# Patient Record
Sex: FEMALE | Race: WHITE | Employment: FULL TIME | URBAN - NONMETROPOLITAN AREA
[De-identification: names, ages, dates, MRNs, and addresses within clinical notes are randomized per-mention and may not be internally consistent; named-entity substitution may affect disease eponyms.]

---

## 2017-06-06 ENCOUNTER — HOSPITAL ENCOUNTER (OUTPATIENT)
Dept: GENERAL RADIOLOGY | Age: 57
Discharge: HOME | End: 2017-06-06
Attending: ORTHOPAEDIC SURGERY | Admitting: ORTHOPAEDIC SURGERY

## 2017-06-06 DIAGNOSIS — S42.255D CLOSED NONDISPLACED FRACTURE OF GREATER TUBEROSITY OF LEFT HUMERUS WITH ROUTINE HEALING: ICD-10-CM

## 2017-06-13 ENCOUNTER — HOSPITAL ENCOUNTER (OUTPATIENT)
Dept: PHYSICAL THERAPY | Age: 57
Discharge: HOME | End: 2017-06-30
Attending: ORTHOPAEDIC SURGERY | Admitting: ORTHOPAEDIC SURGERY

## 2017-06-13 ASSESSMENT — PAIN SCALES - GENERAL: PAINLEVEL_OUTOF10: 3

## 2017-06-23 ENCOUNTER — HOSPITAL ENCOUNTER (OUTPATIENT)
Dept: GENERAL RADIOLOGY | Age: 57
Discharge: HOME | End: 2017-06-23
Attending: ORTHOPAEDIC SURGERY | Admitting: ORTHOPAEDIC SURGERY

## 2017-06-23 DIAGNOSIS — S42.255D CLOSED NONDISPLACED FRACTURE OF GREATER TUBEROSITY OF LEFT HUMERUS WITH ROUTINE HEALING: ICD-10-CM

## 2017-07-01 ENCOUNTER — HOSPITAL ENCOUNTER (OUTPATIENT)
Dept: PHYSICAL THERAPY | Age: 57
Discharge: HOME | End: 2017-07-31
Attending: ORTHOPAEDIC SURGERY | Admitting: ORTHOPAEDIC SURGERY

## 2017-08-01 ENCOUNTER — HOSPITAL ENCOUNTER (OUTPATIENT)
Dept: PHYSICAL THERAPY | Age: 57
Discharge: HOME | End: 2017-08-18
Attending: ORTHOPAEDIC SURGERY | Admitting: ORTHOPAEDIC SURGERY

## 2017-08-08 ENCOUNTER — HOSPITAL ENCOUNTER (OUTPATIENT)
Dept: GENERAL RADIOLOGY | Age: 57
Discharge: HOME | End: 2017-08-08
Attending: ORTHOPAEDIC SURGERY | Admitting: ORTHOPAEDIC SURGERY

## 2017-08-08 DIAGNOSIS — S42.255A: ICD-10-CM

## 2021-01-13 ENCOUNTER — APPOINTMENT (OUTPATIENT)
Dept: GENERAL RADIOLOGY | Age: 61
End: 2021-01-13
Payer: COMMERCIAL

## 2021-01-13 ENCOUNTER — HOSPITAL ENCOUNTER (EMERGENCY)
Age: 61
Discharge: HOME | End: 2021-01-13
Attending: EMERGENCY MEDICINE
Payer: COMMERCIAL

## 2021-01-13 VITALS
DIASTOLIC BLOOD PRESSURE: 92 MMHG | HEIGHT: 59 IN | RESPIRATION RATE: 18 BRPM | TEMPERATURE: 97.7 F | BODY MASS INDEX: 59.07 KG/M2 | WEIGHT: 293 LBS | SYSTOLIC BLOOD PRESSURE: 145 MMHG | OXYGEN SATURATION: 99 % | HEART RATE: 90 BPM

## 2021-01-13 DIAGNOSIS — S52.521A CLOSED TORUS FRACTURE OF DISTAL END OF RIGHT RADIUS, INITIAL ENCOUNTER: ICD-10-CM
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
## 2021-01-13 PROCEDURE — 73110 X-RAY EXAM OF WRIST: CPT

## 2021-01-13 PROCEDURE — 73130 X-RAY EXAM OF HAND: CPT

## 2021-01-13 PROCEDURE — 29125 APPL SHORT ARM SPLINT STATIC: CPT

## 2021-01-13 PROCEDURE — 99282 EMERGENCY DEPT VISIT SF MDM: CPT

## 2021-01-13 RX ORDER — BACLOFEN 10 MG/1
10 TABLET ORAL 3 TIMES DAILY PRN
Qty: 12 TABLET | Refills: 0 | Status: SHIPPED | OUTPATIENT
Start: 2021-01-13 | End: 2021-01-17

## 2021-01-13 RX ORDER — ACETAMINOPHEN 500 MG
500 TABLET ORAL EVERY 6 HOURS PRN
Qty: 28 TABLET | Refills: 0 | Status: SHIPPED | OUTPATIENT
Start: 2021-01-13 | End: 2021-02-19

## 2021-01-13 RX ORDER — IBUPROFEN 600 MG/1
600 TABLET ORAL EVERY 6 HOURS PRN
COMMUNITY

## 2021-01-13 ASSESSMENT — PAIN DESCRIPTION - ORIENTATION: ORIENTATION: RIGHT

## 2021-01-13 ASSESSMENT — ENCOUNTER SYMPTOMS
RESPIRATORY NEGATIVE: 1
SINUS PRESSURE: 0
SHORTNESS OF BREATH: 0
COUGH: 0
SINUS PAIN: 0
EYES NEGATIVE: 1
SORE THROAT: 0
CHEST TIGHTNESS: 0
WHEEZING: 0
BACK PAIN: 0
NAUSEA: 0
EYE PAIN: 0
RHINORRHEA: 0
VOMITING: 0
ABDOMINAL PAIN: 0
GASTROINTESTINAL NEGATIVE: 1
FACIAL SWELLING: 0

## 2021-01-13 ASSESSMENT — PAIN DESCRIPTION - DESCRIPTORS: DESCRIPTORS: STABBING

## 2021-01-13 ASSESSMENT — PAIN DESCRIPTION - PAIN TYPE: TYPE: ACUTE PAIN

## 2021-01-13 ASSESSMENT — PAIN SCALES - GENERAL: PAINLEVEL_OUTOF10: 8

## 2021-01-13 NOTE — ED NOTES
Volar splint placed per order; Discharge instructions reviewed; patient verbalized understanding; patient escorted to [facility] for urine drug screen and BAT per employer policy      Carole Hernandez, [address] [facility]  01/13/21 1529

## 2021-01-13 NOTE — ED PROVIDER NOTES
Mikey [address]      Pt Name: Silvia Agarwal  MRN: [id number]  [place] 1960  Date of evaluation: 1/13/2021  Provider: Jb Short DO    CHIEF COMPLAINT       Chief Complaint   Patient presents with   [facility], works at [employer]. Walked up the ramp into work last night about 2030, tripped and fell face first into concrete ramp hitting Rt side of forehead (+ bruising) and abrasions to nose and upper lip. + pain to lateral Rt wrist and 5th posterior metacarpals. + swelling and bruising. Good ROM to 5th digit and wrist.,         HISTORY OF PRESENT ILLNESS      Silvia Agarwal is a 61 y.o. female who presents to the emergency department  for   Chief Complaint   Patient presents with   [facility], works at [employer]. Walked up the ramp into work last night about 2030, tripped and fell face first into concrete ramp hitting Rt side of forehead (+ bruising) and abrasions to nose and upper lip. + pain to lateral Rt wrist and 5th posterior metacarpals. + swelling and bruising. Good ROM to 5th digit and wrist.,       61-year-old female presents emergency department with chief complaint of right wrist and hand pain status post fall while at work. Patient fell onto outstretched arm while walking up a ramp. Patient also reports minor abrasion to the right side of the forehead. Patient denies loss of consciousness. Patient does not take any anticoagulation. Patient denies vomiting or other neuro symptoms. Patient reports pain with movement of the wrist.  Patient denies all other associated symptoms. The history is provided by the patient and medical records. No  was used. Nursing Notes, Triage Notes & Vital Signs were reviewed.       REVIEW OF SYSTEMS    (2-9 systems for level 4, 10 or more for level 5)     Review of Systems Constitutional: Negative. Negative for chills, fatigue and fever. HENT: Negative. Negative for congestion, dental problem, facial swelling, nosebleeds, postnasal drip, rhinorrhea, sinus pressure, sinus pain and sore throat. Eyes: Negative. Negative for pain and visual disturbance. Respiratory: Negative. Negative for cough, chest tightness, shortness of breath and wheezing. Cardiovascular: Negative. Negative for chest pain and palpitations. Gastrointestinal: Negative. Negative for abdominal pain, nausea and vomiting. Genitourinary: Negative. Negative for dysuria, flank pain, frequency, hematuria and urgency. Musculoskeletal: Positive for arthralgias and joint swelling. Negative for back pain, gait problem, myalgias, neck pain and neck stiffness. Skin: Negative. Negative for rash. Neurological: Negative. Negative for dizziness, speech difficulty, light-headedness, numbness and headaches. Psychiatric/Behavioral: Negative. Negative for agitation and confusion. The patient is not nervous/anxious. All other systems reviewed and are negative. Except as noted above the remainder of the review of systems was reviewed and negative. PAST MEDICAL HISTORY     Past Medical History:   Diagnosis Date    Hyperlipidemia     Obesity        Prior to Admission medications    Medication Sig Start Date End Date Taking?  Authorizing Provider   ibuprofen (ADVIL;MOTRIN) 600 MG tablet Take 600 mg by mouth every 6 hours as needed for Pain   Yes Historical Provider, MD   acetaminophen (TYLENOL) 500 MG tablet Take 1 tablet by mouth every 6 hours as needed for Pain 1/13/21 1/20/21 Yes Feng King DO   baclofen (LIORESAL) 10 MG tablet Take 1 tablet by mouth 3 times daily as needed (muscle spasm) 1/13/21 1/17/21 Yes Feng King, DO   omeprazole (PRILOSEC) 10 MG delayed release capsule Take 10 mg by mouth daily   Yes Historical Provider, MD        Patient Active Problem List   Diagnosis  HLD (hyperlipidemia)    Morbid obesity due to excess calories ([address]         SURGICAL HISTORY       Past Surgical History:   Procedure Laterality Date     SECTION      twice    TONSILLECTOMY           CURRENT MEDICATIONS       Previous Medications    IBUPROFEN (ADVIL;MOTRIN) 600 MG TABLET    Take 600 mg by mouth every 6 hours as needed for Pain    OMEPRAZOLE (PRILOSEC) 10 MG DELAYED RELEASE CAPSULE    Take 10 mg by mouth daily       ALLERGIES     Sulfa antibiotics    FAMILY HISTORY     History reviewed. No pertinent family history.        SOCIAL HISTORY       Social History     Socioeconomic History    Marital status:      Spouse name: None    Number of children: 2    Years of education: 15    Highest education level: None   Occupational History    Occupation: [employer]     Comment: [employer]   Social Needs    Financial resource strain: None    Food insecurity     Worry: None     Inability: None    Transportation needs     Medical: None     Non-medical: None   Tobacco Use    Smoking status: Never Smoker    Smokeless tobacco: Never Used   Substance and Sexual Activity    Alcohol use: No    Drug use: No    Sexual activity: Yes     Partners: Male   Lifestyle    Physical activity     Days per week: None     Minutes per session: None    Stress: None   Relationships    Social connections     Talks on phone: None     Gets together: None     Attends Jain service: None     Active member of club or organization: None     Attends meetings of clubs or organizations: None     Relationship status: None    Intimate partner violence     Fear of current or ex partner: None     Emotionally abused: None     Physically abused: None     Forced sexual activity: None   Other Topics Concern    None   Social History Narrative    None       SCREENINGS               PHYSICAL EXAM    (up to 7 for level 4, 8 or more for level 5)     ED Triage Vitals [21 1550] BP Temp Temp Source Pulse Resp SpO2 Height Weight   (!) 145/92 97.7 °F (36.5 °C) Oral 90 18 99 % 4' 11\" (1.499 m) 300 lb (136.1 kg)       Physical Exam  Vitals signs and nursing note reviewed. Constitutional:       General: She is not in acute distress. Appearance: She is well-developed. She is not diaphoretic. HENT:      Head: Normocephalic and atraumatic. Right Ear: External ear normal.      Left Ear: External ear normal.      Nose: Nose normal.      Mouth/Throat:      Pharynx: No oropharyngeal exudate. Eyes:      General: No scleral icterus. Right eye: No discharge. Left eye: No discharge. Pupils: Pupils are equal, round, and reactive to light. Neck:      Musculoskeletal: Normal range of motion. Thyroid: No thyromegaly. Vascular: No JVD. Trachea: No tracheal deviation. Cardiovascular:      Rate and Rhythm: Normal rate and regular rhythm. Pulses: Normal pulses. Pulmonary:      Effort: Pulmonary effort is normal. No respiratory distress. Abdominal:      General: Bowel sounds are normal.      Palpations: Abdomen is soft. Tenderness: There is no abdominal tenderness. There is no guarding or rebound. Musculoskeletal:         General: Swelling, tenderness and signs of injury present. No deformity. Right wrist: She exhibits decreased range of motion, tenderness, bony tenderness and swelling. She exhibits no effusion, no crepitus, no deformity and no laceration. Lymphadenopathy:      Cervical: No cervical adenopathy. Skin:     General: Skin is warm. Capillary Refill: Capillary refill takes less than 2 seconds. Coloration: Skin is not pale. Findings: No erythema or rash. Neurological:      General: No focal deficit present. Mental Status: She is alert and oriented to person, place, and time. Mental status is at baseline. Cranial Nerves: No cranial nerve deficit. Sensory: No sensory deficit. Motor: No weakness or abnormal muscle tone. Coordination: Coordination normal.      Deep Tendon Reflexes: Reflexes normal.   Psychiatric:         Mood and Affect: Mood normal.         Behavior: Behavior normal.         Thought Content: Thought content normal.         Judgment: Judgment normal.         DIAGNOSTIC RESULTS     Labs Reviewed - No data to display       EKG: All EKG's are interpreted by the Emergency Department Physician who either signs or Co-signs this chart in the absence of a cardiologist.       EKG Interpretation    Interpreted by emergency department physician      Keanu Valdez:     Non-plain film images such as CT, Ultrasound and MRI are read by the radiologist. Plain radiographic images are visualized and preliminarily interpreted by the emergency physician. Interpretation per the Radiologist below, if available at the time of this note:    XR HAND RIGHT (MIN 3 VIEWS)   Preliminary Result   1. Buckle fracture along the right distal radial diaphysis. 2. No acute osseous abnormality of the right hand. XR WRIST RIGHT (MIN 3 VIEWS)   Preliminary Result   1. Buckle fracture along the right distal radial diaphysis. 2. No acute osseous abnormality of the right hand. ED BEDSIDE ULTRASOUND:   Performed by ED Physician Dileep Perez DO       LABS:  Labs Reviewed - No data to display    All other labs were within normal range or not returned as of this dictation.     EMERGENCY DEPARTMENT COURSE and DIFFERENTIAL DIAGNOSIS/MDM:   Vitals:    Vitals:    01/13/21 0635   BP: (!) 145/92   Pulse: 90   Resp: 18   Temp: 97.7 °F (36.5 °C)   TempSrc: Oral   SpO2: 99%   Weight: 300 lb (136.1 kg)   Height: 4' 11\" (1.499 m)           MDM  Number of Diagnoses or Management Options  Closed torus fracture of distal end of right radius, initial encounter  Fall, initial encounter Diagnosis management comments: 51-year-old female presents emergency department with chief complaint of right wrist pain status post fall while at work. Patient fell on outstretched arm. X-rays revealed buckle fracture of the distal radius. Patient was placed in a short arm splint. Patient is neurovascularly intact. Patient has no other associated symptoms. Patient has no facial pain on palpation. Patient is cleared by [facility] CT rules provide Nexus criteria. Will discharge patient to home with work excuse, light duty instructions, baclofen, Tylenol. We will also refer to orthopedics. Return precautions given. Amount and/or Complexity of Data Reviewed  Tests in the radiology section of CPT®: ordered and reviewed    Risk of Complications, Morbidity, and/or Mortality  Presenting problems: moderate  Diagnostic procedures: moderate  Management options: moderate    Critical Care  Total time providing critical care: < 30 minutes    Patient Progress  Patient progress: improved          -  Patient seen and evaluated in the emergency department. -  Triage and nursing notes reviewed and incorporated. -  Old chart records reviewed and incorporated. -  Work-up included:  See above  -  Results discussed with patient. REASSESSMENT          CRITICAL CARE TIME     This excludes seperately billable procedures and family discussion time. Critical care time provided for obtaining history, conducting a physical exam, performing and monitoring interventions, ordering, collecting and interpreting tests, and establishing medical decision-making. There was a potential for life/limb threatening pathology requiring close evaluation and intervention with concern for patient decompensation.     CONSULTS:  None    PROCEDURES:  None performed unless otherwise noted below     Splint Application    Date/Time: 1/13/2021 7:45 AM  Performed by: Maya Mcneil DO  Authorized by: Maya Mcneil DO     Consent: Consent obtained:  Verbal    Consent given by:  Patient    Risks discussed:  Discoloration, numbness, pain and swelling    Alternatives discussed:  No treatment, observation and referral  Pre-procedure details:     Sensation:  Normal    Skin color:  Pink  Procedure details:     Laterality:  Right    Location:  Wrist    Wrist:  R wrist    Strapping: no      Splint type:  Volar short arm    Supplies:  Ortho-Glass  Post-procedure details:     Pain:  Improved    Sensation:  Normal    Skin color:  Pink    Patient tolerance of procedure: Tolerated well, no immediate complications            FINAL IMPRESSION      1. Fall, initial encounter    2. Closed torus fracture of distal end of right radius, initial encounter          DISPOSITION/PLAN   DISPOSITION        PATIENT REFERRED TO:  Bogdan Kamara MD  [address] [place] [address]  [phone]    In 3 days      Oralia Arana MD  [address] [address] [facility] [place] [address]  [phone]    In 3 days        DISCHARGE MEDICATIONS:  New Prescriptions    ACETAMINOPHEN (TYLENOL) 500 MG TABLET    Take 1 tablet by mouth every 6 hours as needed for Pain    BACLOFEN (LIORESAL) 10 MG TABLET    Take 1 tablet by mouth 3 times daily as needed (muscle spasm)       ED Provider Disposition Time  DISPOSITION        Appropriate personal protective equipment was worn during the patient's evaluation. These included surgical, eye protection, surgical mask or in 95 respirator and gloves. The patient was also placed in a surgical mask for the prevention of possible spread of respiratory viral illnesses. The Patient was instructed to read the package inserts with any medication that was prescribed. Major potential reactions and medication interactions were discussed. The Patient understands that there are numerous possible adverse reactions not covered. The patient was also instructed to arrange follow-up with his or her primary care provider for review of any pending labwork or incidental findings on any radiology results that were obtained. All efforts were made to discuss any incidental findings that require further monitoring. Controlled Substances Monitoring:     No flowsheet data found.     (Please note that portions of this note were completed with a voice recognition program.  Efforts were made to edit the dictations but occasionally words are mis-transcribed.)    Troy Fernández DO (electronically signed)  Attending Emergency Physician            Troy Fernández DO  01/13/21 0787

## 2021-01-18 ENCOUNTER — OFFICE VISIT (OUTPATIENT)
Dept: ORTHOPEDIC SURGERY | Age: 61
End: 2021-01-18
Payer: COMMERCIAL

## 2021-01-18 VITALS — HEART RATE: 92 BPM | OXYGEN SATURATION: 93 % | HEIGHT: 59 IN | WEIGHT: 293 LBS | BODY MASS INDEX: 59.07 KG/M2

## 2021-01-18 DIAGNOSIS — S52.551A OTHER CLOSED EXTRA-ARTICULAR FRACTURE OF DISTAL END OF RIGHT RADIUS, INITIAL ENCOUNTER: Primary | ICD-10-CM

## 2021-01-18 DIAGNOSIS — S60.051A CONTUSION OF RIGHT LITTLE FINGER WITHOUT DAMAGE TO NAIL, INITIAL ENCOUNTER: ICD-10-CM

## 2021-01-18 PROCEDURE — 99203 OFFICE O/P NEW LOW 30 MIN: CPT | Performed by: PHYSICIAN ASSISTANT

## 2021-01-18 PROCEDURE — 25600 CLTX DST RDL FX/EPHYS SEP WO: CPT | Performed by: PHYSICIAN ASSISTANT

## 2021-01-18 ASSESSMENT — ENCOUNTER SYMPTOMS
RESPIRATORY NEGATIVE: 1
EYES NEGATIVE: 1
GASTROINTESTINAL NEGATIVE: 1

## 2021-01-18 NOTE — PROGRESS NOTES
I reviewed and agree with the portions of the HPI, review of systems, vital documentation and plan performed by my staff and have added/addended where appropriate. Review of Systems   Constitutional: Negative. HENT: Negative. Eyes: Negative. Respiratory: Negative. Cardiovascular: Negative. Gastrointestinal: Negative. Genitourinary: Negative. Musculoskeletal: Positive for arthralgias. Skin: Negative. Negative for rash and wound. Neurological: Negative. Psychiatric/Behavioral: Negative. Thee To is a 61 y.o. female that is in the office today complaining of right wrist pain and right hand pain over the fifth metacarpal neck. She states that this started when she fell at work and landed with the hand underneath her. She did hit her head as well. She went to the emergency room and occupational health where x-rays were done which appeared to show a cortical disruption in the distal radius which was called a buckle fracture by the radiologist.  Does have pain over the distal radius and over the fifth metacarpal neck. She has not been working since the injury. She has been wearing a cock-up wrist splint given to her by occupational health. 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 Rates her pain today at a 5/10, aching in nature. She actually states that more pain is located over her fifth metacarpal.        Past Medical History:   Diagnosis Date    Hyperlipidemia     Obesity        Past Surgical History:   Procedure Laterality Date     SECTION      twice    TONSILLECTOMY         No family history on file.     Social History     Socioeconomic History    Marital status:      Spouse name: None    Number of children: 2    Years of education: 12    Highest education level: None   Occupational History    Occupation: [employer] Comment: [facility] [employer] [facility]   Social Needs    Financial resource strain: None    Food insecurity     Worry: None     Inability: None    Transportation needs     Medical: None     Non-medical: None   Tobacco Use    Smoking status: Never Smoker    Smokeless tobacco: Never Used   Substance and Sexual Activity    Alcohol use: No    Drug use: No    Sexual activity: Yes     Partners: Male   Lifestyle    Physical activity     Days per week: None     Minutes per session: None    Stress: None   Relationships    Social connections     Talks on phone: None     Gets together: None     Attends Synagogue service: None     Active member of club or organization: None     Attends meetings of clubs or organizations: None     Relationship status: None    Intimate partner violence     Fear of current or ex partner: None     Emotionally abused: None     Physically abused: None     Forced sexual activity: None   Other Topics Concern    None   Social History Narrative    None       Current Outpatient Medications   Medication Sig Dispense Refill    ibuprofen (ADVIL;MOTRIN) 600 MG tablet Take 600 mg by mouth every 6 hours as needed for Pain      acetaminophen (TYLENOL) 500 MG tablet Take 1 tablet by mouth every 6 hours as needed for Pain 28 tablet 0    omeprazole (PRILOSEC) 10 MG delayed release capsule Take 10 mg by mouth daily       No current facility-administered medications for this visit. Allergies   Allergen Reactions    Sulfa Antibiotics      Hives         Review of Systems:  See above      Physical Exam:   Pulse 92   Ht 4' 11\" (1.499 m)   Wt 300 lb (136.1 kg)   SpO2 93%   BMI 60.59 kg/m²        Gait is Normal.     Gen/Psych:Examination reveals a pleasant individual in no acute distress. The patient is oriented to time, place and person. The patient's mood and affect are appropriate. Patient appears well nourished.  Body habitus is morbidly obese Lymph:  no lymphedema in bilateral lower extremities     Skin intact in bilateral lower extremities with no ulcerations, lesions, rash, erythema. Vascular: There are no varicosities in bilateral lower extremities, sensation intact to light touch over bilateral lower extremities. Right Wrist exam  Inspection: Mild edema but no significant ecchymosis in the distal radius. There is ecchymosis in the palmar aspect of the right hand as well as ecchymosis over the fifth metacarpal neck. Palpation: Tenderness to palpation over the distal radius and over the fifth MCP. Range of motion:   Extension: 30 degrees   Flexion: 30 degrees   Radial deviation: 10 degrees   Ulnar deviation: 10 degrees   strength is 5-/5, pain over the fifth metacarpal neck and distal radius when gripping. Outsiderecord review: Review of x-rays and of x-ray reports. Imaging studies:  No new x-rays completed today however x-rays were reviewed which showed a subtle cortical irregularity in the distal radial metaphyseal diaphyseal junction likely indicating a nondisplaced distal radius extra-articular fracture. X-rays of the right hand were reviewed which revealed no evident fracture around the fourth or fifth metacarpal necks. Impression:     Diagnosis Orders   1. Other closed extra-articular fracture of distal end of right radius, initial encounter  [place] CLOSED RX DIST RAD/ULNA FX   2. Contusion of right little finger without damage to nail, initial encounter             Plan:    The most likely impression, expected course, diagnostic and treatment options were discussed, will proceed with:  Natural history and expected course discussed. Questions answered.   Patient Instructions   Non weight bearing on right arm  Continue to wear wrist brace  Follow up 1 week for reevaluation and repeat Xray of right wrist

## 2021-01-25 ENCOUNTER — OFFICE VISIT (OUTPATIENT)
Dept: ORTHOPEDIC SURGERY | Age: 61
End: 2021-01-25

## 2021-01-25 VITALS
BODY MASS INDEX: 59.07 KG/M2 | WEIGHT: 293 LBS | RESPIRATION RATE: 16 BRPM | HEART RATE: 82 BPM | HEIGHT: 59 IN | OXYGEN SATURATION: 96 %

## 2021-01-25 DIAGNOSIS — S52.551A OTHER CLOSED EXTRA-ARTICULAR FRACTURE OF DISTAL END OF RIGHT RADIUS, INITIAL ENCOUNTER: Primary | ICD-10-CM

## 2021-01-25 PROBLEM — S52.501A CLOSED FRACTURE OF RIGHT DISTAL RADIUS: Status: ACTIVE | Noted: 2021-01-25

## 2021-01-25 PROCEDURE — 99024 POSTOP FOLLOW-UP VISIT: CPT | Performed by: PHYSICIAN ASSISTANT

## 2021-01-25 NOTE — PATIENT INSTRUCTIONS
Continue wearing right wrist brace but may remove while at home for gentle range of motion. Okay to return to work on Wednesday evening but she must wear brace while working. No lifting over 5 pounds with right upper extremity. Follow-up in 3 weeks for final visit.

## 2021-01-25 NOTE — LETTER
[facility]  [address]  Phone: [phone]  Fax: [phone]    Margaux Nicolle        January 25, 2021     Patient: Kurtis Yu   YOB: 1960   Date of Visit: 1/25/2021       To Whom It May Concern: It is my medical opinion that Trey Mckeon may return to work on Wednesday evening but must wear brace while working. She cannot lift over 5 pounds with the right upper extremity. Restrictions are in effect until follow-up visit in 3 weeks. If you have any questions or concerns, please don't hesitate to call.     Sincerely,        Marisa Nageotte, PA-C

## 2021-01-25 NOTE — PROGRESS NOTES
Ms. Romario Salmeron returns today for follow-up of a right distal radius fracture. Patient states that she is in the office today for a follow up for his right wrist fracture. Patient states that she is still having difficulty trying to open a twist top pop. Patient denies any new injury or numbness/tingling    Physical Exam:  NAD, AAOX4  right distal radius exam:     No erythema, no edema, no ecchymosis   Mild tenderness over the distal radius and ulna         Xrays were obtained and reviewed  NO change in nondisplaced distal radius fracture  The official read and interpretation of these x-rays will be done by the the [facility]       Impression: left distal radius  Fracture, healing well      Plan:   Patient Instructions   Continue wearing right wrist brace but may remove while at home for gentle range of motion. Okay to return to work on Wednesday evening but she must wear brace while working. No lifting over 5 pounds with right upper extremity. Follow-up in 3 weeks for final visit.

## 2021-02-19 ENCOUNTER — TELEPHONE (OUTPATIENT)
Dept: ORTHOPEDIC SURGERY | Age: 61
End: 2021-02-19

## 2021-02-19 ENCOUNTER — OFFICE VISIT (OUTPATIENT)
Dept: ORTHOPEDIC SURGERY | Age: 61
End: 2021-02-19

## 2021-02-19 VITALS — HEIGHT: 59 IN | RESPIRATION RATE: 16 BRPM | BODY MASS INDEX: 59.07 KG/M2 | WEIGHT: 293 LBS

## 2021-02-19 DIAGNOSIS — S52.551D OTHER CLOSED EXTRA-ARTICULAR FRACTURE OF DISTAL END OF RIGHT RADIUS WITH ROUTINE HEALING, SUBSEQUENT ENCOUNTER: Primary | ICD-10-CM

## 2021-02-19 PROCEDURE — 99024 POSTOP FOLLOW-UP VISIT: CPT | Performed by: PHYSICIAN ASSISTANT

## 2021-02-19 NOTE — LETTER
[facility]  [address] & [employer] [facility] [address]  Phone: [phone]    Jane Acevedo        February 19, 2021     Patient: Mikhail Nieto   YOB: 1960   Date of Visit: 2/19/2021       To Whom it May Concern:    Jia Gautam was seen in my clinic on 2/19/2021. She may return to work on 02/21/2021. Patient may return back to work full duty. No Restrictions. If you have any questions or concerns, please don't hesitate to call.     Sincerely,         Jane Rendon PA-C

## 2021-02-19 NOTE — PROGRESS NOTES
Ms. Shelby Orourke returns today for follow-up of a right distal radius fracture that she sustained a month ago. She states that she is not having any pain now just some weakness. She has been using the brace but is ready to get rid of it. Physical Exam:  Vitals:    02/19/21 0800   Resp: 16   Weight: 300 lb (136.1 kg)   Height: 4' 11\" (1.499 m)      strength is 5/5, range of motion 40 degrees extension, 50 degrees flexion. Xrays were obtained and reviewed in the office today. 3 views of the right wrist show healed nondisplaced fracture of the right distal radius. The official read and interpretation of these x-rays will be done by the the [facility]           Impression:    Diagnosis Orders   1.  Other closed extra-articular fracture of distal end of right radius with routine healing, subsequent encounter             Plan:   Patient Instructions   Work note to return back to work full duty  Continue to weight bear as tolerated  Continue range of motion  Ice and elevate as needed  Tylenol or Motrin for pain  Follow up as needed

## 2024-02-14 ENCOUNTER — HOSPITAL ENCOUNTER (EMERGENCY)
Age: 64
Discharge: HOME | End: 2024-02-14
Attending: EMERGENCY MEDICINE
Payer: COMMERCIAL

## 2024-02-14 ENCOUNTER — APPOINTMENT (OUTPATIENT)
Dept: GENERAL RADIOLOGY | Age: 64
End: 2024-02-14
Attending: EMERGENCY MEDICINE
Payer: COMMERCIAL

## 2024-02-14 VITALS
WEIGHT: 293 LBS | BODY MASS INDEX: 59.07 KG/M2 | HEIGHT: 59 IN | HEART RATE: 99 BPM | TEMPERATURE: 98.3 F | SYSTOLIC BLOOD PRESSURE: 138 MMHG | DIASTOLIC BLOOD PRESSURE: 69 MMHG | OXYGEN SATURATION: 98 % | RESPIRATION RATE: 16 BRPM

## 2024-02-14 DIAGNOSIS — M17.12 PRIMARY OSTEOARTHRITIS OF LEFT KNEE: Primary | ICD-10-CM

## 2024-02-14 PROCEDURE — 73564 X-RAY EXAM KNEE 4 OR MORE: CPT

## 2024-02-14 PROCEDURE — 99283 EMERGENCY DEPT VISIT LOW MDM: CPT

## 2024-02-14 RX ORDER — MELOXICAM 15 MG/1
15 TABLET ORAL DAILY PRN
Qty: 30 TABLET | Refills: 0 | Status: SHIPPED | OUTPATIENT
Start: 2024-02-14

## 2024-02-14 NOTE — ED PROVIDER NOTES
Triage Chief Complaint:   Leg Pain    [place]:  Keli Ford is a 63 y.o. female that presents to the ED with nontraumatic pain to her left knee.  Patient is 136.3 kg female denies any falls or trauma was obtaining wash tensions when she sat down she can get up she is having pain pain is constant worse with movement.  She has no history of known arthritis is never had any injections nor were reported fever.  No radiation        Past Medical History:   Diagnosis Date    Hyperlipidemia     Obesity      Past Surgical History:   Procedure Laterality Date     SECTION      twice    TONSILLECTOMY       No family history on file.  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   Tobacco Use    Smoking status: Never    Smokeless tobacco: Never   Vaping Use    Vaping Use: Never used   Substance and Sexual Activity    Alcohol use: No    Drug use: No    Sexual activity: Yes     Partners: Male   Other Topics Concern    Not on file   Social History Narrative    Not on file     Social Determinants of Health     Financial Resource Strain: Not on file   Food Insecurity: Not on file   Transportation Needs: Not on file   Physical Activity: Not on file   Stress: Not on file   Social Connections: Not on file   Intimate Partner Violence: Not on file   Housing Stability: Not on file     No current facility-administered medications for this encounter.     Current Outpatient Medications   Medication Sig Dispense Refill    meloxicam (MOBIC) 15 MG tablet Take 1 tablet by mouth daily as needed for Pain 30 tablet 0    omeprazole (PRILOSEC) 10 MG delayed release capsule Take 10 mg by mouth daily       Allergies   Allergen Reactions    Sulfa Antibiotics      Hives           ROS:    Review of Systems   Musculoskeletal:

## 2024-03-15 ENCOUNTER — OFFICE VISIT (OUTPATIENT)
Dept: ORTHOPEDIC SURGERY | Age: 64
End: 2024-03-15

## 2024-03-15 VITALS
HEART RATE: 76 BPM | OXYGEN SATURATION: 97 % | HEIGHT: 59 IN | BODY MASS INDEX: 59.07 KG/M2 | WEIGHT: 293 LBS | RESPIRATION RATE: 14 BRPM

## 2024-03-15 DIAGNOSIS — M17.12 PRIMARY OSTEOARTHRITIS OF LEFT KNEE: Primary | ICD-10-CM

## 2024-03-15 RX ORDER — TRIAMCINOLONE ACETONIDE 40 MG/ML
40 INJECTION, SUSPENSION INTRA-ARTICULAR; INTRAMUSCULAR ONCE
Status: COMPLETED | OUTPATIENT
Start: 2024-03-15 | End: 2024-03-15

## 2024-03-15 RX ADMIN — TRIAMCINOLONE ACETONIDE 40 MG: 40 INJECTION, SUSPENSION INTRA-ARTICULAR; INTRAMUSCULAR at 10:01

## 2024-03-15 ASSESSMENT — ENCOUNTER SYMPTOMS
GASTROINTESTINAL NEGATIVE: 1
RESPIRATORY NEGATIVE: 1
EYES NEGATIVE: 1

## 2024-03-15 NOTE — PATIENT INSTRUCTIONS
Continue weight-bearing as tolerated.  Continue range of motion exercises as instructed.  Ice and elevate as needed.  Tylenol or Motrin for pain.  Injection given into the left knee.  Follow up in 6 weeks.    We are committed to providing you the best care possible.  If you receive a survey after visiting one of our offices, please take time to share your experience concerning your physician office visit.  These surveys are confidential and no health information about you is shared.  We are eager to improve for you and we are counting on your feedback to help make that happen.

## 2024-03-15 NOTE — PROGRESS NOTES
Review of Systems   Constitutional: Negative.    HENT: Negative.     Eyes: Negative.    Respiratory: Negative.     Cardiovascular: Negative.    Gastrointestinal: Negative.    Genitourinary: Negative.    Musculoskeletal:  Positive for arthralgias and myalgias.   Skin: Negative.    Neurological: Negative.    Psychiatric/Behavioral: Negative.           HPI:  Keli Ford is a 63 y.o. female who presents the office today for evaluation of left knee pain and left leg pain.  She states that she has had this pain for the last several weeks with no known injury.  She states that she went to sleep and then she woke up and when she went to wake up and put weight down on the leg to stand up she had significant pain throughout the knee.  She does not recall falling or twisting the knee whatsoever.  She states that the left leg is also swollen compared to the right.  She states that she has not been able to work the last week because of the knee pain.  She rates her knee pain today at an 8 or 9/10.    Past Medical History:   Diagnosis Date    Hyperlipidemia     Obesity        Past Surgical History:   Procedure Laterality Date     SECTION      twice    TONSILLECTOMY         History reviewed. No pertinent family history.    Social History     Socioeconomic History    Marital status:      Spouse name: None    Number of children: 2    Years of education: 12    Highest education level: None   Occupational History    Occupation: [employer]     Comment: [employer]   Tobacco Use    Smoking status: Never    Smokeless tobacco: Never   Vaping Use    Vaping Use: Never used   Substance and Sexual Activity    Alcohol use: No    Drug use: No    Sexual activity: Yes     Partners: Male       Current Outpatient Medications   Medication Sig Dispense Refill    meloxicam (MOBIC) 15 MG tablet Take 1 tablet by mouth daily as needed for Pain 30 tablet 0    omeprazole (PRILOSEC) 10 MG delayed release capsule Take 1 capsule

## 2024-04-06 ENCOUNTER — HOSPITAL ENCOUNTER (OUTPATIENT)
Dept: MRI IMAGING | Age: 64
Discharge: HOME | End: 2024-04-06
Attending: ORTHOPAEDIC SURGERY
Payer: COMMERCIAL

## 2024-04-06 DIAGNOSIS — M23.92 UNSPECIFIED INTERNAL DERANGEMENT OF LEFT KNEE: ICD-10-CM

## 2024-04-06 PROCEDURE — 73721 MRI JNT OF LWR EXTRE W/O DYE: CPT
